# Patient Record
Sex: FEMALE | Race: ASIAN
[De-identification: names, ages, dates, MRNs, and addresses within clinical notes are randomized per-mention and may not be internally consistent; named-entity substitution may affect disease eponyms.]

---

## 2017-08-12 ENCOUNTER — HEALTH MAINTENANCE LETTER (OUTPATIENT)
Age: 47
End: 2017-08-12

## 2020-11-29 ENCOUNTER — HEALTH MAINTENANCE LETTER (OUTPATIENT)
Age: 50
End: 2020-11-29

## 2021-05-30 ENCOUNTER — HEALTH MAINTENANCE LETTER (OUTPATIENT)
Age: 51
End: 2021-05-30

## 2021-09-19 ENCOUNTER — HEALTH MAINTENANCE LETTER (OUTPATIENT)
Age: 51
End: 2021-09-19

## 2022-01-09 ENCOUNTER — HEALTH MAINTENANCE LETTER (OUTPATIENT)
Age: 52
End: 2022-01-09

## 2022-08-21 ENCOUNTER — HEALTH MAINTENANCE LETTER (OUTPATIENT)
Age: 52
End: 2022-08-21

## 2022-11-21 ENCOUNTER — HEALTH MAINTENANCE LETTER (OUTPATIENT)
Age: 52
End: 2022-11-21

## 2023-04-16 ENCOUNTER — HEALTH MAINTENANCE LETTER (OUTPATIENT)
Age: 53
End: 2023-04-16

## 2023-11-26 ENCOUNTER — HEALTH MAINTENANCE LETTER (OUTPATIENT)
Age: 53
End: 2023-11-26

## 2024-04-16 ENCOUNTER — OFFICE VISIT (OUTPATIENT)
Dept: ORTHOPEDICS | Facility: CLINIC | Age: 54
End: 2024-04-16
Payer: COMMERCIAL

## 2024-04-16 ENCOUNTER — ANCILLARY PROCEDURE (OUTPATIENT)
Dept: GENERAL RADIOLOGY | Facility: CLINIC | Age: 54
End: 2024-04-16
Attending: PEDIATRICS
Payer: COMMERCIAL

## 2024-04-16 VITALS — BODY MASS INDEX: 30.86 KG/M2 | WEIGHT: 177 LBS

## 2024-04-16 DIAGNOSIS — M25.561 CHRONIC PAIN OF RIGHT KNEE: ICD-10-CM

## 2024-04-16 DIAGNOSIS — M25.561 CHRONIC PAIN OF RIGHT KNEE: Primary | ICD-10-CM

## 2024-04-16 DIAGNOSIS — G89.29 CHRONIC PAIN OF RIGHT KNEE: Primary | ICD-10-CM

## 2024-04-16 DIAGNOSIS — G89.29 CHRONIC PAIN OF RIGHT KNEE: ICD-10-CM

## 2024-04-16 PROCEDURE — 99204 OFFICE O/P NEW MOD 45 MIN: CPT | Performed by: PEDIATRICS

## 2024-04-16 PROCEDURE — 73562 X-RAY EXAM OF KNEE 3: CPT | Mod: TC | Performed by: RADIOLOGY

## 2024-04-16 NOTE — PROGRESS NOTES
ASSESSMENT & PLAN    Amirah was seen today for pain.    Diagnoses and all orders for this visit:    Chronic pain of right knee  -     XR Knee Standing AP [place] Bilat Lat Right; Future  -     MR Knee Right w/o Contrast; Future          See Patient Instructions  Patient Instructions   Reviewed issues in the right knee, with anterior knee crepitus.  This most likely represents patellofemoral chondromalacia, or some change in the articular cartilage in the kneecap compartment.  We discussed considerations around additional imaging with MRI, referral to physical therapy, also potential for injections.  Following discussion, plan MRI of the right knee next, given history of injuries.  From there, consideration may be referral to physical therapy, but await imaging findings.    Advanced imaging is done by appointment. Please call [facility] ([facility]/[place]) [phone] , [facility] ([facility]/[place]) [phone] , or [facility] ([facility]) [phone]  to schedule your MRI.     Some insurance companies may require a prior authorization to be completed which can delay the time until you are able to schedule your appointment.       If you are active on [employer], you may have access to your test results before your provider is able to review the study and advise on next steps.      Please make a follow up appointment in the clinic no sooner than 2 days following your MRI by calling [phone].  Alternatively, if interested in phone or [employer] message with results and potential next steps, contact clinic after study has been completed.        If you have any further questions for your physician or physician s care team you can contact them thru [employer] or by calling [phone].      Rehan Howard, [facility] [place]    -----  Chief Complaint   Patient presents with    Right Knee - Pain        SUBJECTIVE  Amirah Graham is a/an 54 year old female who is seen as a self referral for evaluation of right knee.     The patient is seen by themselves.    Onset: 15 years(s) ago. Patient describes injury as 15 years ago she had a slip and fall on the driveway which brought her to the ED. Notes that she was told she probably had a torn meniscus. Last summer she notes that she was in [place] and fell on the knee again.  Location of Pain: right knee, diffuse around the knee, anteriorly  Worsened by: Knee flexion for a longer period of time, stairs,   Better with: Wrap around brace when it was painful  Treatments tried: ice and wrap around brace  Associated symptoms: feeling of instability, velcro like feeling inside the knee    Orthopedic/Surgical history: No surgical history  Social History/Occupation:     **  Above information per rooming staff.  Additional history:  With original injury 15 years ago, took maybe couple months to get back towards normal.    With more recent injury landed on anterior knee onto sand, with pain.  Noted for next couple days, felt like some malalignment in right knee. Used brace, icing. Brace was helpful.  Was there for nearly 2 weeks and after a week pain had improved.    More recently has noted with flexion of right knee for more than ~5 mins, gets pain. Also has noted concerns about stability, sense of giving way.  With stairs, gets velcro sound.        REVIEW OF SYSTEMS:  Review of Systems    OBJECTIVE:  Wt 80.3 kg (177 lb)   BMI 30.86 kg/m           Right Knee exam    Inspection:   trace effusion   no ecchymosis    ROM:      Full active and passive ROM with extension  Flexion mild limitation with pain    Patellar Motion:      Crepitus noted in the patellofemoral joint    Special Tests:      anterior pain with Lorraine       neg (-) Lachman       neg (-) anterior drawer       neg (-) posterior drawer       neg (-) varus        neg (-) valgus        no pain with  forced extension    Evaluation of ipsilateral kinetic chain:   Some anterior knee excursion with pain with crepitus      RADIOLOGY:  Final results and radiologist's interpretation, available in the [facility] health record.  Images were reviewed with the patient in the office today.  My personal interpretation of the performed imaging: no acute bony abnormality noted. Minimal hypertrophic change patella.      XR Knee Standing AP [place] Bilat Lat Right    Narrative    KNEE STANDING AP [place] BILAT LAT RIGHT April 16, 2024 4:05 PM     HISTORY:  Chronic pain of right knee.    COMPARISON: None.      Impression    IMPRESSION: Tiny patellofemoral osteophytes bilaterally. Normal  otherwise.    FRANK MERAZ MD         SYSTEM ID:  [id number]

## 2024-04-16 NOTE — PATIENT INSTRUCTIONS
Reviewed issues in the right knee, with anterior knee crepitus.  This most likely represents patellofemoral chondromalacia, or some change in the articular cartilage in the kneecap compartment.  We discussed considerations around additional imaging with MRI, referral to physical therapy, also potential for injections.  Following discussion, plan MRI of the right knee next, given history of injuries.  From there, consideration may be referral to physical therapy, but await imaging findings.    Advanced imaging is done by appointment. Please call [facility] ([facility]/[place]) [phone] , [facility] ([facility]/[place]) [phone] , or [facility] ([facility]) [phone]  to schedule your MRI.     Some insurance companies may require a prior authorization to be completed which can delay the time until you are able to schedule your appointment.       If you are active on [employer], you may have access to your test results before your provider is able to review the study and advise on next steps.      Please make a follow up appointment in the clinic no sooner than 2 days following your MRI by calling [phone].  Alternatively, if interested in phone or [employer] message with results and potential next steps, contact clinic after study has been completed.        If you have any further questions for your physician or physician s care team you can contact them thru [employer] or by calling [phone].

## 2024-04-16 NOTE — LETTER
4/16/2024         RE: Amirah Graham  [address]  [place] [address]        Dear Colleague,    Thank you for referring your patient, Amirah Graham, to the [facility] [place]. Please see a copy of my visit note below.    ASSESSMENT & PLAN    Amirah was seen today for pain.    Diagnoses and all orders for this visit:    Chronic pain of right knee  -     XR Knee Standing AP [place] Bilat Lat Right; Future  -     MR Knee Right w/o Contrast; Future          See Patient Instructions  Patient Instructions   Reviewed issues in the right knee, with anterior knee crepitus.  This most likely represents patellofemoral chondromalacia, or some change in the articular cartilage in the kneecap compartment.  We discussed considerations around additional imaging with MRI, referral to physical therapy, also potential for injections.  Following discussion, plan MRI of the right knee next, given history of injuries.  From there, consideration may be referral to physical therapy, but await imaging findings.    Advanced imaging is done by appointment. Please call [facility] ([facility]/[place]) [phone] , [facility] ([facility]) [phone] , or [facility] ([facility]) [phone]  to schedule your MRI.     Some insurance companies may require a prior authorization to be completed which can delay the time until you are able to schedule your appointment.       If you are active on [employer], you may have access to your test results before your provider is able to review the study and advise on next steps.      Please make a follow up appointment in the clinic no sooner than 2 days following your MRI by calling [phone].  Alternatively, if interested in phone or [employer] message with results and potential next steps, contact clinic after study has been completed.        If you have any further  questions for your physician or physician s care team you can contact them thru MyChart or by calling [phone].      [facility] [place]    -----  Chief Complaint   Patient presents with     Right Knee - Pain       SUBJECTIVE  Amirah Graham is a/an 54 year old female who is seen as a self referral for evaluation of right knee.     The patient is seen by themselves.    Onset: 15 years(s) ago. Patient describes injury as 15 years ago she had a slip and fall on the driveway which brought her to the ED. Notes that she was told she probably had a torn meniscus. Last summer she notes that she was in [place] and fell on the knee again.  Location of Pain: right knee, diffuse around the knee, anteriorly  Worsened by: Knee flexion for a longer period of time, stairs,   Better with: Wrap around brace when it was painful  Treatments tried: ice and wrap around brace  Associated symptoms: feeling of instability, velcro like feeling inside the knee    Orthopedic/Surgical history: No surgical history  Social History/Occupation:     **  Above information per rooming staff.  Additional history:  With original injury 15 years ago, took maybe couple months to get back towards normal.    With more recent injury landed on anterior knee onto sand, with pain.  Noted for next couple days, felt like some malalignment in right knee. Used brace, icing. Brace was helpful.  Was there for nearly 2 weeks and after a week pain had improved.    More recently has noted with flexion of right knee for more than ~5 mins, gets pain. Also has noted concerns about stability, sense of giving way.  With stairs, gets velcro sound.        REVIEW OF SYSTEMS:  Review of Systems    OBJECTIVE:  Wt 80.3 kg (177 lb)   BMI 30.86 kg/m           Right Knee exam    Inspection:   trace effusion   no ecchymosis    ROM:      Full active and passive ROM with extension  Flexion mild limitation with  pain    Patellar Motion:      Crepitus noted in the patellofemoral joint    Special Tests:      anterior pain with Lorraine       neg (-) Lachman       neg (-) anterior drawer       neg (-) posterior drawer       neg (-) varus        neg (-) valgus        no pain with forced extension    Evaluation of ipsilateral kinetic chain:   Some anterior knee excursion with pain with crepitus      RADIOLOGY:  Final results and radiologist's interpretation, available in the [facility] health record.  Images were reviewed with the patient in the office today.  My personal interpretation of the performed imaging: no acute bony abnormality noted. Minimal hypertrophic change patella.      XR Knee Standing AP [place] Bilat Lat Right    Narrative    KNEE STANDING AP [place] BILAT LAT RIGHT April 16, 2024 4:05 PM     HISTORY:  Chronic pain of right knee.    COMPARISON: None.      Impression    IMPRESSION: Tiny patellofemoral osteophytes bilaterally. Normal  otherwise.    FRANK MERAZ MD         SYSTEM ID:  [id number]           Again, thank you for allowing me to participate in the care of your patient.        Sincerely,        Rehan Howard DO

## 2024-05-02 ENCOUNTER — ANCILLARY PROCEDURE (OUTPATIENT)
Dept: MRI IMAGING | Facility: CLINIC | Age: 54
End: 2024-05-02
Attending: PEDIATRICS
Payer: COMMERCIAL

## 2024-05-02 DIAGNOSIS — M25.561 CHRONIC PAIN OF RIGHT KNEE: ICD-10-CM

## 2024-05-02 DIAGNOSIS — G89.29 CHRONIC PAIN OF RIGHT KNEE: ICD-10-CM

## 2024-05-02 PROCEDURE — 73721 MRI JNT OF LWR EXTRE W/O DYE: CPT | Mod: RT | Performed by: RADIOLOGY

## 2024-05-08 ENCOUNTER — TELEPHONE (OUTPATIENT)
Dept: ORTHOPEDICS | Facility: CLINIC | Age: 54
End: 2024-05-08
Payer: COMMERCIAL

## 2024-05-08 NOTE — TELEPHONE ENCOUNTER
Addendum:     Typographical error.     Impression:  1. Constellation of imaging findings consistent with patellofemoral  maltracking with lateral patellar subluxation and grade IV  chondromalacia of the patellofemoral compartment.  2. Grade 2 chondromalacia of the medial compartment.  3. Mild pes anserine bursitis.     MR right knee without contrast 5/2/2024 8:08 PM     Techniques: Multiplanar multisequence imaging of the right knee was  obtained without administration of intra-articular or intravenous  contrast using routing protocol.     History: evaluate patellofemoral compartment; Chronic pain of right  knee; Chronic pain of right knee     Comparison: Radiograph 4/16/2024     Findings:     Motion mildly degrading images.     MENISCI:  Medial meniscus: Intact.  Lateral meniscus: Intact.     LIGAMENTS  Cruciate ligaments: Intact.  Medial supporting structures: Intact.  Lateral supporting structures: Intact.     EXTENSOR MECHANISM  Distal quadriceps tendinosis. Patellar tendon is intact. Focal edema  at the superolateral aspect of Hoffa's fat pad. Insall-Salvati ratio  measures 1.35, within normal limits. Trochlear groove to tibial  tuberosity distance measures 14 mm, within normal limits. Trochlear  groove is not dysplastic. Lateral patellar subluxation.     FLUID  Small joint effusion. No substantial Baker's cyst. Mild pes anserine  bursitis.     OSSEOUS and ARTICULAR STRUCTURES  Bones: No fracture or contusion is seen. Approximately 8 mm in  mediolateral dimension chondroid appearing lesion in the distal femur,  likely representing an enchondroma.     Patellofemoral compartment: Grade IV chondromalacia manifesting as  full-thickness chondral loss with subchondral edema-like marrow signal  intensity at the lateral patellar facet and lateral trochlea.     Medial compartment: Grade II chondromalacia with moderate grade  chondral loss central weightbearing surface of medial femoral condyle.     Lateral compartment:  No high-grade hyaline cartilage disease.     ANCILLARY FINDINGS  None.                                                                      Impression:  1. Constellation of imaging findings consistent with patellofemoral  tracking with lateral patellar subluxation and grade IV chondromalacia  of the patellofemoral compartment.  2. Grade 2 chondromalacia of the medial compartment.  3. Mild pes anserine bursitis.     I have personally reviewed the examination and initial interpretation  and I agree with the findings.     CORTEZ MEJÍA

## 2024-05-13 NOTE — TELEPHONE ENCOUNTER
Looks like appointment scheduled for 5/21.  If pt inquires about results ahead of that time, can let her know that the primary finding is related to patellofemoral chondromalacia (wearing change of the cartilage in the kneecap compartment), as we discussed at clinic visit. Otherwise, can wait until follow-up visit to review with pt.  Thanks.  Rehan Howard, , CAQ

## 2024-05-21 ENCOUNTER — OFFICE VISIT (OUTPATIENT)
Dept: ORTHOPEDICS | Facility: CLINIC | Age: 54
End: 2024-05-21
Payer: COMMERCIAL

## 2024-05-21 VITALS — WEIGHT: 177 LBS | BODY MASS INDEX: 30.86 KG/M2

## 2024-05-21 DIAGNOSIS — M94.261 CHONDROMALACIA OF RIGHT KNEE: Primary | ICD-10-CM

## 2024-05-21 DIAGNOSIS — M25.561 CHRONIC PAIN OF RIGHT KNEE: ICD-10-CM

## 2024-05-21 DIAGNOSIS — G89.29 CHRONIC PAIN OF RIGHT KNEE: ICD-10-CM

## 2024-05-21 PROCEDURE — 99214 OFFICE O/P EST MOD 30 MIN: CPT | Performed by: PEDIATRICS

## 2024-05-21 NOTE — LETTER
5/21/2024         RE: Amirah Graham  [address]  [place] [address]        Dear Colleague,    Thank you for referring your patient, Amirah Graham, to the [facility] [place]. Please see a copy of my visit note below.    ASSESSMENT & PLAN    Amirah was seen today for follow up.    Diagnoses and all orders for this visit:    Chondromalacia of right knee  -     Physical Therapy  Referral; Future  -     Ankle/Knee Bracing Supplies Order Hinged Knee Brace (Lateral-J Hinge Knee XL); Right    Chronic pain of right knee  -     Ankle/Knee Bracing Supplies Order Hinged Knee Brace (Lateral-J Hinge Knee XL); Right        See Patient Instructions  Patient Instructions   Reviewed right knee MRI, demonstrating chondromalacia (articular cartilage wearing change) primarily in the patellofemoral (kneecap) compartment, and mild in the medial (inner) compartment.  We discussed considerations around physical therapy, use of brace, use of medications, potential for injection, and referral to discuss further with orthopedic surgery.  Following discussion, plan physical therapy next, order placed.  Use of patellar stabilizing brace reviewed today.  If using the brace, primarily focused on just with activities, and we remove when at rest.  Defer any injections for now.  Future considerations could include steroid versus Visco supplement injection.  With PT, plan to monitor at least 4 to 6 weeks to begin.  If improving, or if satisfied with progress, then follow-up is open-ended.    If you have any further questions for your physician or physician s care team you can contact them thru [employer] or by calling [phone].      Rehan Howard DO  [facility] [place]    SUBJECTIVE- Interim History May 21, 2024    Chief Complaint   Patient presents with     Right Knee - Follow Up       Amirah Graham is a 54 year old female who is seen in f/u up for Data  "Unavailable. Since last visit on 4/16/24 patient has notes that she still gets \"cracking\" in the knee, reporting that the instability seems to be improving. Bending the knee is tight and painful compared to left knee. No interim injury.    The patient is seen by themselves.    Orthopedic/Surgical history: NO  Social History/Occupation:     **  Above information per rooming staff.  Additional history:  Recall: initial injury slid in snow, then seen in ED  About 3-4 years later, was garage saleing, and fell on driveway, landed on anterior knee bilaterally. Still has issues with kneeling.          REVIEW OF SYSTEMS:  Review of Systems    OBJECTIVE:  Wt 80.3 kg (177 lb)   BMI 30.86 kg/m       Right knee with mildly limited flexion, with pain, tightness  Crepitus in PF compartment    RADIOLOGY:  Final results and radiologist's interpretation, available in the [facility] health record.  Images were reviewed with the patient in the office today.  My personal interpretation of the performed imaging: PF maltracking and arthrosis.        Addendum:     Typographical error.     Impression:  1. Constellation of imaging findings consistent with patellofemoral  maltracking with lateral patellar subluxation and grade IV  chondromalacia of the patellofemoral compartment.  2. Grade 2 chondromalacia of the medial compartment.  3. Mild pes anserine bursitis.     CORTEZ MEJÍA         SYSTEM ID:  [id number]   Addended by Cortez Mejía MD on 5/3/2024 11:56 AM     Study Result    Narrative & Impression   MR right knee without contrast 5/2/2024 8:08 PM     Techniques: Multiplanar multisequence imaging of the right knee was  obtained without administration of intra-articular or intravenous  contrast using routing protocol.     History: evaluate patellofemoral compartment; Chronic pain of right  knee; Chronic pain of right knee     Comparison: Radiograph 4/16/2024     Findings:     Motion mildly degrading images.   "   MENISCI:  Medial meniscus: Intact.  Lateral meniscus: Intact.     LIGAMENTS  Cruciate ligaments: Intact.  Medial supporting structures: Intact.  Lateral supporting structures: Intact.     EXTENSOR MECHANISM  Distal quadriceps tendinosis. Patellar tendon is intact. Focal edema  at the superolateral aspect of Hoffa's fat pad. Insall-Salvati ratio  measures 1.35, within normal limits. Trochlear groove to tibial  tuberosity distance measures 14 mm, within normal limits. Trochlear  groove is not dysplastic. Lateral patellar subluxation.     FLUID  Small joint effusion. No substantial Baker's cyst. Mild pes anserine  bursitis.     OSSEOUS and ARTICULAR STRUCTURES  Bones: No fracture or contusion is seen. Approximately 8 mm in  mediolateral dimension chondroid appearing lesion in the distal femur,  likely representing an enchondroma.     Patellofemoral compartment: Grade IV chondromalacia manifesting as  full-thickness chondral loss with subchondral edema-like marrow signal  intensity at the lateral patellar facet and lateral trochlea.     Medial compartment: Grade II chondromalacia with moderate grade  chondral loss central weightbearing surface of medial femoral condyle.     Lateral compartment: No high-grade hyaline cartilage disease.     ANCILLARY FINDINGS  None.                                                                      Impression:  1. Constellation of imaging findings consistent with patellofemoral  tracking with lateral patellar subluxation and grade IV chondromalacia  of the patellofemoral compartment.  2. Grade 2 chondromalacia of the medial compartment.  3. Mild pes anserine bursitis.     I have personally reviewed the examination and initial interpretation  and I agree with the findings.     CORTEZ MEJÍA          Over 30 minutes spent by me on the date of the encounter doing chart review, history and exam, documentation and further activities per the note           Again, thank you for allowing  me to participate in the care of your patient.        Sincerely,        Rehan Howard, DO

## 2024-05-21 NOTE — PATIENT INSTRUCTIONS
Reviewed right knee MRI, demonstrating chondromalacia (articular cartilage wearing change) primarily in the patellofemoral (kneecap) compartment, and mild in the medial (inner) compartment.  We discussed considerations around physical therapy, use of brace, use of medications, potential for injection, and referral to discuss further with orthopedic surgery.  Following discussion, plan physical therapy next, order placed.  Use of patellar stabilizing brace reviewed today.  If using the brace, primarily focused on just with activities, and we remove when at rest.  Defer any injections for now.  Future considerations could include steroid versus Visco supplement injection.  With PT, plan to monitor at least 4 to 6 weeks to begin.  If improving, or if satisfied with progress, then follow-up is open-ended.    If you have any further questions for your physician or physician s care team you can contact them thru [employer] or by calling [phone].

## 2024-05-21 NOTE — PROGRESS NOTES
"ASSESSMENT & PLAN    Amirah was seen today for follow up.    Diagnoses and all orders for this visit:    Chondromalacia of right knee  -     Physical Therapy  Referral; Future  -     Ankle/Knee Bracing Supplies Order Hinged Knee Brace (Lateral-J Hinge Knee XL); Right    Chronic pain of right knee  -     Ankle/Knee Bracing Supplies Order Hinged Knee Brace (Lateral-J Hinge Knee XL); Right        See Patient Instructions  Patient Instructions   Reviewed right knee MRI, demonstrating chondromalacia (articular cartilage wearing change) primarily in the patellofemoral (kneecap) compartment, and mild in the medial (inner) compartment.  We discussed considerations around physical therapy, use of brace, use of medications, potential for injection, and referral to discuss further with orthopedic surgery.  Following discussion, plan physical therapy next, order placed.  Use of patellar stabilizing brace reviewed today.  If using the brace, primarily focused on just with activities, and we remove when at rest.  Defer any injections for now.  Future considerations could include steroid versus Visco supplement injection.  With PT, plan to monitor at least 4 to 6 weeks to begin.  If improving, or if satisfied with progress, then follow-up is open-ended.    If you have any further questions for your physician or physician s care team you can contact them thru [employer] or by calling [phone].      [facility]    SUBJECTIVE- Interim History May 21, 2024    Chief Complaint   Patient presents with    Right Knee - Follow Up       Amirah Graham is a 54 year old female who is seen in f/u up for Data Unavailable. Since last visit on 4/16/24 patient has notes that she still gets \"cracking\" in the knee, reporting that the instability seems to be improving. Bending the knee is tight and painful compared to left knee. No interim injury.    The patient is seen by " themselves.    Orthopedic/Surgical history: NO  Social History/Occupation:     **  Above information per rooming staff.  Additional history:  Recall: initial injury slid in snow, then seen in ED  About 3-4 years later, was garage saleing, and fell on driveway, landed on anterior knee bilaterally. Still has issues with kneeling.          REVIEW OF SYSTEMS:  Review of Systems    OBJECTIVE:  Wt 80.3 kg (177 lb)   BMI 30.86 kg/m       Right knee with mildly limited flexion, with pain, tightness  Crepitus in PF compartment    RADIOLOGY:  Final results and radiologist's interpretation, available in the [facility] health record.  Images were reviewed with the patient in the office today.  My personal interpretation of the performed imaging: PF maltracking and arthrosis.        Addendum:     Typographical error.     Impression:  1. Constellation of imaging findings consistent with patellofemoral  maltracking with lateral patellar subluxation and grade IV  chondromalacia of the patellofemoral compartment.  2. Grade 2 chondromalacia of the medial compartment.  3. Mild pes anserine bursitis.     CORTEZ MEJÍA         SYSTEM ID:  [id number]   Addended by Cortez Mejía MD on 5/3/2024 11:56 AM     Study Result    Narrative & Impression   MR right knee without contrast 5/2/2024 8:08 PM     Techniques: Multiplanar multisequence imaging of the right knee was  obtained without administration of intra-articular or intravenous  contrast using routing protocol.     History: evaluate patellofemoral compartment; Chronic pain of right  knee; Chronic pain of right knee     Comparison: Radiograph 4/16/2024     Findings:     Motion mildly degrading images.     MENISCI:  Medial meniscus: Intact.  Lateral meniscus: Intact.     LIGAMENTS  Cruciate ligaments: Intact.  Medial supporting structures: Intact.  Lateral supporting structures: Intact.     EXTENSOR MECHANISM  Distal quadriceps tendinosis. Patellar tendon is intact.  Focal edema  at the superolateral aspect of Hoffa's fat pad. Insall-Salvati ratio  measures 1.35, within normal limits. Trochlear groove to tibial  tuberosity distance measures 14 mm, within normal limits. Trochlear  groove is not dysplastic. Lateral patellar subluxation.     FLUID  Small joint effusion. No substantial Baker's cyst. Mild pes anserine  bursitis.     OSSEOUS and ARTICULAR STRUCTURES  Bones: No fracture or contusion is seen. Approximately 8 mm in  mediolateral dimension chondroid appearing lesion in the distal femur,  likely representing an enchondroma.     Patellofemoral compartment: Grade IV chondromalacia manifesting as  full-thickness chondral loss with subchondral edema-like marrow signal  intensity at the lateral patellar facet and lateral trochlea.     Medial compartment: Grade II chondromalacia with moderate grade  chondral loss central weightbearing surface of medial femoral condyle.     Lateral compartment: No high-grade hyaline cartilage disease.     ANCILLARY FINDINGS  None.                                                                      Impression:  1. Constellation of imaging findings consistent with patellofemoral  tracking with lateral patellar subluxation and grade IV chondromalacia  of the patellofemoral compartment.  2. Grade 2 chondromalacia of the medial compartment.  3. Mild pes anserine bursitis.     I have personally reviewed the examination and initial interpretation  and I agree with the findings.     CORTEZ MEJÍA          Over 30 minutes spent by me on the date of the encounter doing chart review, history and exam, documentation and further activities per the note

## 2024-05-30 ENCOUNTER — DOCUMENTATION ONLY (OUTPATIENT)
Dept: ORTHOPEDICS | Facility: CLINIC | Age: 54
End: 2024-05-30
Payer: COMMERCIAL

## 2024-05-30 DIAGNOSIS — M94.261 CHONDROMALACIA OF RIGHT KNEE: Primary | ICD-10-CM

## 2024-06-23 ENCOUNTER — HEALTH MAINTENANCE LETTER (OUTPATIENT)
Age: 54
End: 2024-06-23

## 2024-07-06 ENCOUNTER — THERAPY VISIT (OUTPATIENT)
Dept: PHYSICAL THERAPY | Facility: CLINIC | Age: 54
End: 2024-07-06
Attending: PEDIATRICS
Payer: COMMERCIAL

## 2024-07-06 DIAGNOSIS — M25.561 RIGHT KNEE PAIN: Primary | ICD-10-CM

## 2024-07-06 DIAGNOSIS — M94.261 CHONDROMALACIA OF RIGHT KNEE: ICD-10-CM

## 2024-07-06 PROCEDURE — 97161 PT EVAL LOW COMPLEX 20 MIN: CPT | Mod: GP | Performed by: PHYSICAL THERAPIST

## 2024-07-06 PROCEDURE — 97110 THERAPEUTIC EXERCISES: CPT | Mod: GP | Performed by: PHYSICAL THERAPIST

## 2024-07-06 ASSESSMENT — ACTIVITIES OF DAILY LIVING (ADL)
PLEASE_INDICATE_YOR_PRIMARY_REASON_FOR_REFERRAL_TO_THERAPY:: KNEE
HOW_WOULD_YOU_RATE_THE_OVERALL_FUNCTION_OF_YOUR_KNEE_DURING_YOUR_USUAL_DAILY_ACTIVITIES?: ABNORMAL
PAIN: THE SYMPTOM AFFECTS MY ACTIVITY SLIGHTLY
HOW_WOULD_YOU_RATE_THE_OVERALL_FUNCTION_OF_YOUR_KNEE_DURING_YOUR_USUAL_DAILY_ACTIVITIES?: ABNORMAL
KNEE_ACTIVITY_OF_DAILY_LIVING_SUM: 3
PAIN: THE SYMPTOM AFFECTS MY ACTIVITY SLIGHTLY

## 2024-07-06 NOTE — PROGRESS NOTES
PHYSICAL THERAPY EVALUATION  Type of Visit: Evaluation    Pt presents to PT with complaints of R knee pain with walking and stairs.         Fall Risk Screen:  Fall screen completed by: PT  Have you fallen 2 or more times in the past year?: No  Have you fallen and had an injury in the past year?: No  Is patient a fall risk?: No    Subjective       Presenting condition or subjective complaint: knee hurts when i move it like bending sonetimes when I straghten it  Date of onset: 05/21/24    Relevant medical history: Diabetes; Menopause; Overweight   Dates & types of surgery:      Prior diagnostic imaging/testing results: MRI; X-ray     R knee MRI 5/2/24:    Impression:  1. Constellation of imaging findings consistent with patellofemoral  maltracking with lateral patellar subluxation and grade IV  chondromalacia of the patellofemoral compartment.  2. Grade 2 chondromalacia of the medial compartment.  3. Mild pes anserine bursitis.    Prior therapy history for the same diagnosis, illness or injury:        Prior Level of Function  Transfers:   Ambulation:   ADL:   IADL:     Living Environment  Social support: With family members   Type of home: House   Stairs to enter the home:         Ramp: No   Stairs inside the home: Yes       Help at home: [employer] (home health aide/personal care attendant, family, etc)  Equipment owned:       Employment: Yes    Hobbies/Interests:      Patient goals for therapy: no pain    Pain assessment: pain in R anterior knee with prolonged walking, stairs descent is worse than ascent, or initial walking after a period of immobility     Objective   KNEE EVALUATION  PAIN:   INTEGUMENTARY (edema, incisions):   POSTURE:   GAIT:antalgic gait on level and stairs without handrails using reciprocal pattern but complaints of pain worse on descent  BALANCE/PROPRIOCEPTION:   WEIGHTBEARING ALIGNMENT:   NON-WEIGHTBEARING ALIGNMENT:   ROM: knee AROM L: 0-0-137degs R: 0-0-125degs   STRENGTH: B hip flx, abd,  ext 4/5 bilaterally  FLEXIBILITY: WNL  SPECIAL TESTS: patellar compression + on R  FUNCTIONAL TESTS: pain with stairs  PALPATION: no TTP  JOINT MOBILITY: WNL patella    mid patella girth R: 41.7cm L: 41.3cm       Assessment & Plan   CLINICAL IMPRESSIONS  Medical Diagnosis: R knee pain    Treatment Diagnosis: R knee pain   Impression/Assessment: Patient is a 54 year old female with R knee complaints.  The following significant findings have been identified: Pain, Decreased ROM/flexibility, Decreased strength, and Impaired gait. These impairments interfere with their ability to perform recreational activities, household chores, household mobility, and community mobility as compared to previous level of function.     Clinical Decision Making (Complexity):  Clinical Presentation: Stable/Uncomplicated  Clinical Presentation Rationale: based on medical and personal factors listed in PT evaluation  Clinical Decision Making (Complexity): Low complexity    PLAN OF CARE  Treatment Interventions:  Modalities: Cryotherapy, Hot Pack  Interventions: Gait Training, Manual Therapy, Neuromuscular Re-education, Therapeutic Activity, Therapeutic Exercise    Long Term Goals     PT Goal 1  Goal Identifier: walking  Goal Description: pt will be able to walk 30mins including negotiating stairs painfree.  Rationale: to maximize safety and independence within the community  Target Date: 09/14/24      Frequency of Treatment: 1x/wk  Duration of Treatment: 10wks    Recommended Referrals to Other Professionals:   Education Assessment:   Learner/Method: Patient;Demonstration;Pictures/Video  Education Comments: knee anatomy and mechanics, rehab progression and expectations    Risks and benefits of evaluation/treatment have been explained.   Patient/Family/caregiver agrees with Plan of Care.     Evaluation Time:     PT Eval, Low Complexity Minutes (83744): 15       Signing Clinician: Kang Cowan PT

## 2024-07-27 ENCOUNTER — THERAPY VISIT (OUTPATIENT)
Dept: PHYSICAL THERAPY | Facility: CLINIC | Age: 54
End: 2024-07-27
Attending: PEDIATRICS
Payer: COMMERCIAL

## 2024-07-27 DIAGNOSIS — M25.561 RIGHT KNEE PAIN: Primary | ICD-10-CM

## 2024-07-27 PROCEDURE — 97110 THERAPEUTIC EXERCISES: CPT | Mod: GP | Performed by: PHYSICAL THERAPIST

## 2024-07-27 PROCEDURE — 97112 NEUROMUSCULAR REEDUCATION: CPT | Mod: GP | Performed by: PHYSICAL THERAPIST

## 2024-08-03 ENCOUNTER — THERAPY VISIT (OUTPATIENT)
Dept: PHYSICAL THERAPY | Facility: CLINIC | Age: 54
End: 2024-08-03
Attending: PEDIATRICS
Payer: COMMERCIAL

## 2024-08-03 DIAGNOSIS — M25.561 RIGHT KNEE PAIN: Primary | ICD-10-CM

## 2024-08-03 PROCEDURE — 97112 NEUROMUSCULAR REEDUCATION: CPT | Mod: GP

## 2024-08-03 PROCEDURE — 97110 THERAPEUTIC EXERCISES: CPT | Mod: GP

## 2024-09-06 ENCOUNTER — THERAPY VISIT (OUTPATIENT)
Dept: PHYSICAL THERAPY | Facility: CLINIC | Age: 54
End: 2024-09-06
Payer: COMMERCIAL

## 2024-09-06 DIAGNOSIS — M25.561 RIGHT KNEE PAIN: Primary | ICD-10-CM

## 2024-09-06 PROCEDURE — 97110 THERAPEUTIC EXERCISES: CPT | Mod: GP | Performed by: PHYSICAL THERAPIST

## 2024-10-04 ENCOUNTER — THERAPY VISIT (OUTPATIENT)
Dept: PHYSICAL THERAPY | Facility: CLINIC | Age: 54
End: 2024-10-04
Payer: COMMERCIAL

## 2024-10-04 DIAGNOSIS — M25.561 RIGHT KNEE PAIN: Primary | ICD-10-CM

## 2024-10-04 PROCEDURE — 97110 THERAPEUTIC EXERCISES: CPT | Mod: GP | Performed by: PHYSICAL THERAPIST

## 2025-01-04 ENCOUNTER — HEALTH MAINTENANCE LETTER (OUTPATIENT)
Age: 55
End: 2025-01-04

## 2025-07-02 PROBLEM — M25.561 RIGHT KNEE PAIN: Status: RESOLVED | Noted: 2024-07-06 | Resolved: 2025-07-02

## 2025-07-12 ENCOUNTER — HEALTH MAINTENANCE LETTER (OUTPATIENT)
Age: 55
End: 2025-07-12